# Patient Record
Sex: MALE | Race: WHITE | NOT HISPANIC OR LATINO
[De-identification: names, ages, dates, MRNs, and addresses within clinical notes are randomized per-mention and may not be internally consistent; named-entity substitution may affect disease eponyms.]

---

## 2020-07-22 ENCOUNTER — DASHBOARD ENCOUNTERS (OUTPATIENT)
Age: 70
End: 2020-07-22

## 2020-07-22 ENCOUNTER — OFFICE VISIT (OUTPATIENT)
Dept: URBAN - METROPOLITAN AREA CLINIC 19 | Facility: CLINIC | Age: 70
End: 2020-07-22
Payer: MEDICARE

## 2020-07-22 DIAGNOSIS — R13.10 ESOPHAGEAL DYSPHAGIA: ICD-10-CM

## 2020-07-22 DIAGNOSIS — D50.0 IRON DEFICIENCY ANEMIA DUE TO CHRONIC BLOOD LOSS: ICD-10-CM

## 2020-07-22 PROCEDURE — 99214 OFFICE O/P EST MOD 30 MIN: CPT | Performed by: INTERNAL MEDICINE

## 2020-07-22 PROCEDURE — 3017F COLORECTAL CA SCREEN DOC REV: CPT | Performed by: INTERNAL MEDICINE

## 2020-07-22 PROCEDURE — 1036F TOBACCO NON-USER: CPT | Performed by: INTERNAL MEDICINE

## 2020-07-22 PROCEDURE — G8427 DOCREV CUR MEDS BY ELIG CLIN: HCPCS | Performed by: INTERNAL MEDICINE

## 2020-07-22 RX ORDER — METHYLDOPA 250 MG/1
TABLET, FILM COATED ORAL
Qty: 0 | Refills: 0 | Status: ACTIVE | COMMUNITY
Start: 1900-01-01

## 2020-07-22 RX ORDER — DILTIAZEM HYDROCHLORIDE 120 MG/1
CAPSULE, EXTENDED RELEASE ORAL
Qty: 0 | Refills: 0 | Status: ACTIVE | COMMUNITY
Start: 1900-01-01

## 2020-07-22 RX ORDER — GLIPIZIDE 10 MG/1
TABLET ORAL
Qty: 0 | Refills: 0 | Status: ACTIVE | COMMUNITY
Start: 1900-01-01

## 2020-07-22 RX ORDER — LOSARTAN POTASSIUM 25 MG/1
TABLET, FILM COATED ORAL
Qty: 0 | Refills: 0 | Status: ACTIVE | COMMUNITY
Start: 1900-01-01

## 2020-07-22 RX ORDER — METFORMIN HYDROCHLORIDE 1000 MG/1
TAKE 1 TABLET (1,000 MG) BY ORAL ROUTE 2 TIMES PER DAY WITH MORNING AND EVENING MEALS TABLET, COATED ORAL 2
Qty: 0 | Refills: 0 | Status: ACTIVE | COMMUNITY
Start: 1900-01-01

## 2020-07-22 RX ORDER — DICYCLOMINE HYDROCHLORIDE 10 MG/1
CAPSULE ORAL
Qty: 0 | Refills: 0 | Status: ACTIVE | COMMUNITY
Start: 1900-01-01

## 2020-07-22 NOTE — HPI-TODAY'S VISIT:
Mr. Rowe is a  70 year old male who was last seen in [facility] on 8/16/2018 for iron deficiency anemia.   On 2/6/2019 he had a colonoscopy with Dr. Ronal Chavez that showed normal appearing anorectal anastomosis and a 5 mm descending colon polyp.   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   Mr. Rowe reports not noticing any overt blood in stool.   He had labs performed on 7/17/2020 that showed HgB 12.9.   He reports in March 2020 having dysphagia/odynophagia. He reports symptoms have improved but not resolved with time. We discussed performing an EGD but he reports being concerned about the coronovirus.   Prior history is summarized below: -Note from 2/28/2018 was reviewed and mentioned Mr. Rowe had a normal bone marrow biopsy and has a history of colon cancer in 2015 s/p LAR. -Mr. Rowe had a colonoscopy on 2/7/2018 was normal but as prep was suboptimal, repeat colonoscopy was recommended in 1 year. -Mr. Rowe had an EGD on 5/16/2018 that showed mild gastric antrum and bulb erythema. Gastric biopsies showed gastritis but no H. pylori. Duodenal biopsies were normal.  -On 7/17/2018 he had a Pillcam performed that showed small non-specific and non-bleeding flat red spot in the mid small bowel. The report mentioned the location and size would make it difficult to find endoscopically. It was recommended to continue to monitor iron and hemoglobin levels. If decreasing, consider antegrade single balloon enteroscopy.

## 2021-07-07 ENCOUNTER — OFFICE VISIT (OUTPATIENT)
Dept: URBAN - METROPOLITAN AREA CLINIC 19 | Facility: CLINIC | Age: 71
End: 2021-07-07